# Patient Record
Sex: FEMALE | Race: WHITE | NOT HISPANIC OR LATINO
[De-identification: names, ages, dates, MRNs, and addresses within clinical notes are randomized per-mention and may not be internally consistent; named-entity substitution may affect disease eponyms.]

---

## 2021-07-22 PROBLEM — Z00.00 ENCOUNTER FOR PREVENTIVE HEALTH EXAMINATION: Status: ACTIVE | Noted: 2021-07-22

## 2021-08-03 DIAGNOSIS — K57.90 DIVERTICULOSIS OF INTESTINE, PART UNSPECIFIED, W/OUT PERFORATION OR ABSCESS W/OUT BLEEDING: ICD-10-CM

## 2021-08-03 DIAGNOSIS — Z86.79 PERSONAL HISTORY OF OTHER DISEASES OF THE CIRCULATORY SYSTEM: ICD-10-CM

## 2021-08-03 DIAGNOSIS — Z85.528 PERSONAL HISTORY OF OTHER MALIGNANT NEOPLASM OF KIDNEY: ICD-10-CM

## 2021-08-05 ENCOUNTER — APPOINTMENT (OUTPATIENT)
Dept: SURGERY | Facility: CLINIC | Age: 66
End: 2021-08-05
Payer: MEDICARE

## 2021-08-05 VITALS
BODY MASS INDEX: 22.98 KG/M2 | WEIGHT: 114 LBS | HEART RATE: 114 BPM | HEIGHT: 59 IN | TEMPERATURE: 97.6 F | DIASTOLIC BLOOD PRESSURE: 89 MMHG | SYSTOLIC BLOOD PRESSURE: 129 MMHG

## 2021-08-05 PROCEDURE — 99214 OFFICE O/P EST MOD 30 MIN: CPT

## 2021-08-05 RX ORDER — TRIAMTERENE AND HYDROCHLOROTHIAZIDE 37.5; 25 MG/1; MG/1
CAPSULE ORAL
Refills: 0 | Status: ACTIVE | COMMUNITY

## 2021-08-05 NOTE — HISTORY OF PRESENT ILLNESS
[de-identified] : Pt returns after recent hospitalization for localized ruptured diverticulitis. This was her first episode. She has done well with conservative management. Now off all po abx since going home and feels well. She has no abd pain.

## 2021-08-05 NOTE — PHYSICAL EXAM
[Abdominal Masses] : No abdominal masses [Abdomen Tenderness] : ~T ~M No abdominal tenderness [de-identified] : NAD [de-identified] : benign

## 2021-08-05 NOTE — PLAN
[FreeTextEntry1] : She will f/u with me if sx recur. She will see her GI Dr Burks and f/u with her PCO Dr Carpenter

## 2021-08-05 NOTE — ASSESSMENT
[FreeTextEntry1] : Long discussion with the patient and her  regarding options. My recommendation is to have her f/u with her GI Dr Leo Burks for further dietary management and consideration of an interval C scope. Elective surgery discussed but I would not recommend elective sigmoid resection at this time unless her symptoms recur.

## 2021-08-05 NOTE — CONSULT LETTER
[Dear  ___] : Dear  [unfilled], [Courtesy Letter:] : I had the pleasure of seeing your patient, [unfilled], in my office today. [DrHerbert  ___] : Dr. MOMIN

## 2021-09-16 ENCOUNTER — APPOINTMENT (OUTPATIENT)
Dept: SURGERY | Facility: CLINIC | Age: 66
End: 2021-09-16
Payer: MEDICARE

## 2021-09-16 VITALS
TEMPERATURE: 97.1 F | SYSTOLIC BLOOD PRESSURE: 161 MMHG | HEART RATE: 82 BPM | WEIGHT: 112 LBS | DIASTOLIC BLOOD PRESSURE: 95 MMHG | BODY MASS INDEX: 22.58 KG/M2 | HEIGHT: 59 IN

## 2021-09-16 PROCEDURE — 99213 OFFICE O/P EST LOW 20 MIN: CPT

## 2021-09-16 NOTE — HISTORY OF PRESENT ILLNESS
[de-identified] : The patient presents to report on a few occasions, especially over the past two weeks, of abdominal twinges and aches that are short, intermittent and sharp. these have been more frequent recently. She has nml bowel movements and is s/p an episode of diverticulitis requiring hospitalization and IV thx. That was her first attack.. She relates to increase stress recently. \par She has seen her GI, Dr Cheung who is planning ojn a C scope at some point.

## 2021-09-16 NOTE — REVIEW OF SYSTEMS
[Abdominal Pain] : abdominal pain [Negative] : Respiratory [Fever] : no fever [Chills] : no chills [Vomiting] : no vomiting [Constipation] : no constipation [Diarrhea] : no diarrhea

## 2021-09-16 NOTE — PHYSICAL EXAM
[Normal Breath Sounds] : Normal breath sounds [Normal Heart Sounds] : normal heart sounds [de-identified] : NAD [de-identified] : soft, benign, mild LLQ discomfort to deep palpation, no rebound or guarding,

## 2021-09-16 NOTE — ASSESSMENT
[FreeTextEntry1] : mild divertciulitis, no abx ior intervention suggested currently since she is asx

## 2021-09-16 NOTE — PLAN
[FreeTextEntry1] : to f/u with her GI for a C scope, she will continue to monitor symptoms and if they recur or are more severe can discuss abx use vs surgical intervention. But this is not recommended currently.

## 2021-09-20 ENCOUNTER — APPOINTMENT (OUTPATIENT)
Dept: BARIATRICS/WEIGHT MGMT | Facility: CLINIC | Age: 66
End: 2021-09-20

## 2022-11-22 ENCOUNTER — APPOINTMENT (OUTPATIENT)
Dept: SURGERY | Facility: CLINIC | Age: 67
End: 2022-11-22

## 2022-11-22 VITALS
WEIGHT: 116 LBS | HEIGHT: 58 IN | SYSTOLIC BLOOD PRESSURE: 146 MMHG | TEMPERATURE: 98 F | BODY MASS INDEX: 24.35 KG/M2 | DIASTOLIC BLOOD PRESSURE: 84 MMHG | HEART RATE: 91 BPM

## 2022-11-22 DIAGNOSIS — R10.32 LEFT LOWER QUADRANT PAIN: ICD-10-CM

## 2022-11-22 PROCEDURE — 99213 OFFICE O/P EST LOW 20 MIN: CPT

## 2022-11-22 RX ORDER — LIDOCAINE HCL 4 %
250 MCG CREAM (GRAM) TOPICAL
Refills: 0 | Status: ACTIVE | COMMUNITY

## 2022-11-22 NOTE — ASSESSMENT
[FreeTextEntry1] : clinical impression, mild diverticulitis \par will need labs/CT scan and will empirically start Augmentin, she knows to go to the ED if her conditions worsens .

## 2022-11-22 NOTE — HISTORY OF PRESENT ILLNESS
[de-identified] : the patient returns for recent 3 day symptoms of intermittent sharp LLQ associated with some constipation and hard stool. She has been well for the past 1 1/2 yrs but 3 days ago felt a sharp pain in ht LLQ which went away quickly but came back intermittently yet.  [de-identified] : Previous visit  9/21/2022:\par \par The patient presents to report on a few occasions, especially over the past two weeks, of abdominal twinges and aches that are short, intermittent and sharp. these have been more frequent recently. She has nml bowel movements and is s/p an episode of diverticulitis requiring hospitalization and IV thx. That was her first attack.. She relates to increase stress recently. \par She has seen her GI, Dr Cheung who is planning ojn a C scope at some point.\par \par Plan:to f/u with her GI for a C scope, she will continue to monitor symptoms and if they recur or are more severe can discuss abx use vs surgical intervention. But this is not recommended currently.\par

## 2022-11-23 RX ORDER — AMOXICILLIN AND CLAVULANATE POTASSIUM 500; 125 MG/1; MG/1
500-125 TABLET, FILM COATED ORAL
Qty: 10 | Refills: 1 | Status: ACTIVE | COMMUNITY
Start: 2022-11-23 | End: 1900-01-01

## 2022-11-28 ENCOUNTER — APPOINTMENT (OUTPATIENT)
Dept: BREAST CENTER | Facility: CLINIC | Age: 67
End: 2022-11-28

## 2022-11-28 ENCOUNTER — NON-APPOINTMENT (OUTPATIENT)
Age: 67
End: 2022-11-28

## 2022-11-28 VITALS
HEIGHT: 58 IN | DIASTOLIC BLOOD PRESSURE: 83 MMHG | SYSTOLIC BLOOD PRESSURE: 144 MMHG | HEART RATE: 83 BPM | WEIGHT: 113 LBS | BODY MASS INDEX: 23.72 KG/M2 | OXYGEN SATURATION: 97 %

## 2022-11-28 DIAGNOSIS — N60.11 DIFFUSE CYSTIC MASTOPATHY OF LEFT BREAST: ICD-10-CM

## 2022-11-28 DIAGNOSIS — K57.30 DIVERTICULOSIS OF LARGE INTESTINE W/OUT PERFORATION OR ABSCESS W/OUT BLEEDING: ICD-10-CM

## 2022-11-28 DIAGNOSIS — N64.4 MASTODYNIA: ICD-10-CM

## 2022-11-28 DIAGNOSIS — N60.12 DIFFUSE CYSTIC MASTOPATHY OF LEFT BREAST: ICD-10-CM

## 2022-11-28 PROCEDURE — 99203 OFFICE O/P NEW LOW 30 MIN: CPT

## 2022-11-28 NOTE — ASSESSMENT
[FreeTextEntry1] : The patient is a 67-year-old G5, P0 postmenopausal white female of [place] descent.  She underwent menopause at age 48 and never took any hormone replacement therapy.  She underwent menarche at age 11.  She has a questionable family history of breast cancer and a maternal great aunt.  He has a history of undergoing a partial nephrectomy for renal cell carcinoma not requiring any adjuvant therapy back in 2003.  She has been having some lateral left breast tenderness but screening mammography in July 2022 was negative.  She was noted to have some thickening on the lateral left breast by her primary care physician.  I reviewed her mammography and from July 27, 2022 performed at [facility] which showed no suspicious findings.  On exam, I cannot feel any suspicious findings around the lateral aspect of the left breast and she does have this benign lipoma felt along the far lateral left chest wall which is clinically benign.  She does have some tenderness however on the lateral left breast.  This may be due to her chronic use of underwire bras.  I would like her to get a bilateral breast ultrasound just to be sure that there are no suspicious breast findings on ultrasound and if that is negative, I think she can just go back to her routine yearly clinical exams and her next mammography would not be due until July 2023.

## 2022-11-28 NOTE — HISTORY OF PRESENT ILLNESS
[FreeTextEntry1] : The patient is a 67-year-old G5, P0 postmenopausal white female of [place] descent.  She underwent menopause at age 48 and never took any hormone replacement therapy.  She underwent menarche at age 11.  She has a questionable family history of breast cancer and a maternal great aunt.  He has a history of undergoing a partial nephrectomy for renal cell carcinoma not requiring any adjuvant therapy back in 2003.  She has been having some lateral left breast tenderness but screening mammography in July 2022 was negative.  She was noted to have some thickening by her primary care physician on physical exam and comes in now for a surgical evaluation.

## 2022-11-28 NOTE — REVIEW OF SYSTEMS
[Abdominal Pain] : abdominal pain [Diarrhea] : diarrhea [Heartburn] : heartburn [Negative] : Heme/Lymph

## 2022-11-28 NOTE — ADDENDUM
[FreeTextEntry1] : I spent greater than 75% of the consultation in face-to-face counseling and coordination of care for her left lateral breast tenderness.

## 2022-11-28 NOTE — PAST MEDICAL HISTORY
[Menarche Age ____] : age at menarche was [unfilled] [Menopause Age____] : age at menopause was [unfilled] [Total Preg ___] : G[unfilled] [Premature ___] : Premature: [unfilled] [History of Hormone Replacement Treatment] : has no history of hormone replacement treatment

## 2022-11-28 NOTE — PHYSICAL EXAM
[Normal Breath Sounds] : Normal breath sounds [Normal Heart Sounds] : normal heart sounds [No Rash or Lesion] : No rash or lesion [Alert] : alert [Oriented to Person] : oriented to person [Oriented to Place] : oriented to place [Oriented to Time] : oriented to time [Calm] : calm [Normocephalic] : normocephalic [Atraumatic] : atraumatic [EOMI] : extra ocular movement intact [Supple] : supple [No Supraclavicular Adenopathy] : no supraclavicular adenopathy [No Cervical Adenopathy] : no cervical adenopathy [Examined in the supine and seated position] : examined in the supine and seated position [No dominant masses] : no dominant masses in right breast  [No dominant masses] : no dominant masses left breast [No Nipple Retraction] : no left nipple retraction [No Nipple Discharge] : no left nipple discharge [Breast Mass Right Breast ___cm] : no masses [Breast Mass Left Breast ___cm] : no masses [Breast Nipple Inversion] : nipples not inverted [Breast Nipple Retraction] : nipples not retracted [Breast Nipple Flattening] : nipples not flattened [Breast Nipple Fissures] : nipples not fissured [Breast Abnormal Lactation (Galactorrhea)] : no galactorrhea [Breast Abnormal Secretion Bloody Fluid] : no bloody discharge [Breast Abnormal Secretion Serous Fluid] : no serous discharge [Breast Abnormal Secretion Opalescent Fluid] : no milky discharge [No Axillary Lymphadenopathy] : no left axillary lymphadenopathy [No Edema] : no edema [No Rashes] : no rashes [No Ulceration] : no ulceration [de-identified] : On exam, the patient has ptotic C-cup breasts.  On palpation, I cannot feel any suspicious densities in either breast but she does complain of some left lateral breast tenderness.  I can feel a lipoma along the far lateral chest wall however.  She has no axillary, supraclavicular, or cervical adenopathy.

## 2022-11-28 NOTE — REASON FOR VISIT
[Initial Evaluation] : an initial evaluation [FreeTextEntry1] : The patient is a postmenopausal female of [place] descent with a questionable family history of breast cancer who has been noticing some left lateral breast pain and was referred by her primary care physician.

## 2022-12-12 ENCOUNTER — RESULT REVIEW (OUTPATIENT)
Age: 67
End: 2022-12-12

## 2022-12-13 ENCOUNTER — NON-APPOINTMENT (OUTPATIENT)
Age: 67
End: 2022-12-13

## 2023-03-07 ENCOUNTER — TRANSCRIPTION ENCOUNTER (OUTPATIENT)
Age: 68
End: 2023-03-07

## 2023-03-14 ENCOUNTER — TRANSCRIPTION ENCOUNTER (OUTPATIENT)
Age: 68
End: 2023-03-14

## 2023-03-25 ENCOUNTER — OFFICE (OUTPATIENT)
Dept: URBAN - METROPOLITAN AREA CLINIC 29 | Facility: CLINIC | Age: 68
Setting detail: OPHTHALMOLOGY
End: 2023-03-25
Payer: MEDICARE

## 2023-03-25 DIAGNOSIS — H40.1111: ICD-10-CM

## 2023-03-25 DIAGNOSIS — H43.391: ICD-10-CM

## 2023-03-25 DIAGNOSIS — H40.1231: ICD-10-CM

## 2023-03-25 DIAGNOSIS — H43.813: ICD-10-CM

## 2023-03-25 DIAGNOSIS — H40.1121: ICD-10-CM

## 2023-03-25 DIAGNOSIS — Z96.1: ICD-10-CM

## 2023-03-25 DIAGNOSIS — D18.01: ICD-10-CM

## 2023-03-25 DIAGNOSIS — H26.493: ICD-10-CM

## 2023-03-25 PROCEDURE — 92014 COMPRE OPH EXAM EST PT 1/>: CPT | Performed by: OPHTHALMOLOGY

## 2023-03-25 PROCEDURE — 92020 GONIOSCOPY: CPT | Performed by: OPHTHALMOLOGY

## 2023-03-25 PROCEDURE — 92133 CPTRZD OPH DX IMG PST SGM ON: CPT | Performed by: OPHTHALMOLOGY

## 2023-03-25 ASSESSMENT — SPHEQUIV_DERIVED
OD_SPHEQUIV: -2.375
OS_SPHEQUIV: -2.75
OS_SPHEQUIV: -2.75
OD_SPHEQUIV: -2.25
OD_SPHEQUIV: -2.25
OS_SPHEQUIV: -3.25
OD_SPHEQUIV: -2.375
OS_SPHEQUIV: -2.875

## 2023-03-25 ASSESSMENT — REFRACTION_CURRENTRX
OS_ADD: +2.50
OS_VPRISM_DIRECTION: TRF
OD_VPRISM_DIRECTION: TRF
OD_CYLINDER: +1.25
OD_OVR_VA: 20/
OS_ADD: +2.75
OD_AXIS: 90
OD_ADD: +2.75
OD_CYLINDER: +1.50
OS_CYLINDER: +0.75
OD_SPHERE: -2.00
OS_OVR_VA: 20/
OS_SPHERE: -3.25
OD_VPRISM_DIRECTION: TRF
OD_SPHERE: -2.75
OS_VPRISM_DIRECTION: TRF
OS_CYLINDER: +1.00
OD_SPHERE: -2.00
OD_VPRISM_DIRECTION: TRF
OS_AXIS: 160
OS_SPHERE: -4.50
OS_AXIS: 180
OS_OVR_VA: 20/
OS_VPRISM_DIRECTION: TRF
OS_CYLINDER: +1.25
OS_ADD: +2.50
OD_ADD: +2.50
OD_AXIS: 120
OD_CYLINDER: SPHERE
OS_OVR_VA: 20/
OD_ADD: +2.50
OD_OVR_VA: 20/
OD_AXIS: 123
OD_OVR_VA: 20/
OS_SPHERE: -4.50
OS_AXIS: 171

## 2023-03-25 ASSESSMENT — TONOMETRY
OD_IOP_MMHG: 14
OS_IOP_MMHG: 14

## 2023-03-25 ASSESSMENT — REFRACTION_MANIFEST
OU_VA: 20/20
OD_CYLINDER: +0.25
OS_AXIS: 160
OU_VA: 20/20
OS_ADD: +2.75
OS_VA1: 20/20-1
OS_SPHERE: -3.25
OS_VA1: 20/20-2
OD_AXIS: 90
OS_AXIS: 180
OS_SPHERE: -3.50
OS_SPHERE: -3.25
OD_AXIS: 90
OD_VA1: 20/20
OS_AXIS: 165
OD_CYLINDER: +0.50
OD_VA1: 20/20
OS_CYLINDER: +0.75
OS_VA1: 20/20-2
OD_SPHERE: -2.50
OS_CYLINDER: +0.50
OD_SPHERE: -2.50
OD_AXIS: 090
OD_ADD: +2.75
OD_CYLINDER: +0.25
OS_CYLINDER: +1.00
OD_SPHERE: -2.50
OD_VA1: 20/20

## 2023-03-25 ASSESSMENT — REFRACTION_AUTOREFRACTION
OS_SPHERE: -3.25
OS_AXIS: 175
OD_CYLINDER: +0.50
OD_AXIS: 025
OS_CYLINDER: +1.00
OD_SPHERE: -2.50

## 2023-03-25 ASSESSMENT — CONFRONTATIONAL VISUAL FIELD TEST (CVF)
OS_FINDINGS: FULL
OD_FINDINGS: FULL

## 2023-03-25 ASSESSMENT — VISUAL ACUITY
OD_BCVA: 20/20-2
OS_BCVA: 20/30+2

## 2023-09-20 ENCOUNTER — OFFICE (OUTPATIENT)
Dept: URBAN - METROPOLITAN AREA CLINIC 30 | Facility: CLINIC | Age: 68
Setting detail: OPHTHALMOLOGY
End: 2023-09-20
Payer: MEDICARE

## 2023-09-20 DIAGNOSIS — H40.1111: ICD-10-CM

## 2023-09-20 DIAGNOSIS — H43.813: ICD-10-CM

## 2023-09-20 DIAGNOSIS — H40.1231: ICD-10-CM

## 2023-09-20 DIAGNOSIS — Z96.1: ICD-10-CM

## 2023-09-20 DIAGNOSIS — D18.01: ICD-10-CM

## 2023-09-20 DIAGNOSIS — H40.1121: ICD-10-CM

## 2023-09-20 DIAGNOSIS — H26.493: ICD-10-CM

## 2023-09-20 DIAGNOSIS — H43.391: ICD-10-CM

## 2023-09-20 DIAGNOSIS — H33.311: ICD-10-CM

## 2023-09-20 PROCEDURE — 99214 OFFICE O/P EST MOD 30 MIN: CPT | Performed by: OPHTHALMOLOGY

## 2023-09-20 PROCEDURE — 92201 OPSCPY EXTND RTA DRAW UNI/BI: CPT | Performed by: OPHTHALMOLOGY

## 2023-09-20 PROCEDURE — 92083 EXTENDED VISUAL FIELD XM: CPT | Performed by: OPHTHALMOLOGY

## 2023-09-20 ASSESSMENT — REFRACTION_MANIFEST
OU_VA: 20/20
OS_ADD: +2.75
OS_AXIS: 180
OS_VA1: 20/20-1
OS_CYLINDER: +0.75
OD_CYLINDER: +0.50
OD_SPHERE: -2.50
OD_AXIS: 090
OD_SPHERE: -2.50
OS_AXIS: 165
OD_VA1: 20/20
OS_VA1: 20/20-2
OD_ADD: +2.75
OD_AXIS: 90
OD_AXIS: 90
OU_VA: 20/20
OS_SPHERE: -3.25
OD_SPHERE: -2.50
OS_CYLINDER: +1.00
OD_VA1: 20/20
OS_CYLINDER: +0.50
OD_VA1: 20/20
OS_VA1: 20/20-2
OS_SPHERE: -3.50
OD_CYLINDER: +0.25
OS_AXIS: 160
OS_SPHERE: -3.25
OD_CYLINDER: +0.25

## 2023-09-20 ASSESSMENT — REFRACTION_AUTOREFRACTION
OS_SPHERE: -3.25
OD_AXIS: 025
OS_AXIS: 175
OD_SPHERE: -2.50
OS_CYLINDER: +1.00
OD_CYLINDER: +0.50

## 2023-09-20 ASSESSMENT — REFRACTION_CURRENTRX
OD_ADD: +2.75
OD_CYLINDER: +1.50
OS_SPHERE: -4.50
OD_VPRISM_DIRECTION: TRF
OD_AXIS: 123
OS_CYLINDER: +1.00
OS_ADD: +2.50
OS_CYLINDER: +1.25
OS_VPRISM_DIRECTION: TRF
OS_AXIS: 171
OS_VPRISM_DIRECTION: TRF
OS_ADD: +2.75
OD_VPRISM_DIRECTION: TRF
OD_OVR_VA: 20/
OD_ADD: +2.50
OS_SPHERE: -3.25
OS_OVR_VA: 20/
OS_OVR_VA: 20/
OD_OVR_VA: 20/
OD_VPRISM_DIRECTION: TRF
OS_OVR_VA: 20/
OS_ADD: +2.50
OD_AXIS: 90
OS_AXIS: 160
OD_SPHERE: -2.00
OD_CYLINDER: SPHERE
OD_CYLINDER: +1.25
OD_AXIS: 120
OD_SPHERE: -2.75
OD_SPHERE: -2.00
OD_ADD: +2.50
OS_CYLINDER: +0.75
OD_OVR_VA: 20/
OS_AXIS: 180
OS_SPHERE: -4.50
OS_VPRISM_DIRECTION: TRF

## 2023-09-20 ASSESSMENT — CONFRONTATIONAL VISUAL FIELD TEST (CVF)
OS_FINDINGS: FULL
OD_FINDINGS: FULL

## 2023-09-20 ASSESSMENT — SPHEQUIV_DERIVED
OD_SPHEQUIV: -2.375
OS_SPHEQUIV: -2.75
OS_SPHEQUIV: -2.75
OD_SPHEQUIV: -2.25
OS_SPHEQUIV: -2.875
OD_SPHEQUIV: -2.25
OS_SPHEQUIV: -3.25
OD_SPHEQUIV: -2.375

## 2023-09-20 ASSESSMENT — VISUAL ACUITY
OS_BCVA: 20/25
OD_BCVA: 20/20-2

## 2023-09-20 ASSESSMENT — TONOMETRY
OS_IOP_MMHG: 14
OD_IOP_MMHG: 14

## 2023-11-18 ENCOUNTER — RESULT REVIEW (OUTPATIENT)
Age: 68
End: 2023-11-18

## 2023-11-20 ENCOUNTER — TRANSCRIPTION ENCOUNTER (OUTPATIENT)
Age: 68
End: 2023-11-20

## 2024-03-13 ENCOUNTER — OFFICE (OUTPATIENT)
Dept: URBAN - METROPOLITAN AREA CLINIC 30 | Facility: CLINIC | Age: 69
Setting detail: OPHTHALMOLOGY
End: 2024-03-13
Payer: MEDICARE

## 2024-03-13 DIAGNOSIS — H33.311: ICD-10-CM

## 2024-03-13 DIAGNOSIS — H43.813: ICD-10-CM

## 2024-03-13 DIAGNOSIS — H40.1111: ICD-10-CM

## 2024-03-13 DIAGNOSIS — H26.493: ICD-10-CM

## 2024-03-13 DIAGNOSIS — H43.391: ICD-10-CM

## 2024-03-13 DIAGNOSIS — Z96.1: ICD-10-CM

## 2024-03-13 DIAGNOSIS — H40.1121: ICD-10-CM

## 2024-03-13 DIAGNOSIS — H40.1231: ICD-10-CM

## 2024-03-13 DIAGNOSIS — D18.01: ICD-10-CM

## 2024-03-13 PROCEDURE — 92133 CPTRZD OPH DX IMG PST SGM ON: CPT | Performed by: OPHTHALMOLOGY

## 2024-03-13 PROCEDURE — 92020 GONIOSCOPY: CPT | Performed by: OPHTHALMOLOGY

## 2024-03-13 PROCEDURE — 92014 COMPRE OPH EXAM EST PT 1/>: CPT | Performed by: OPHTHALMOLOGY

## 2024-03-13 ASSESSMENT — REFRACTION_CURRENTRX
OD_AXIS: 120
OS_OVR_VA: 20/
OS_SPHERE: -4.50
OD_VPRISM_DIRECTION: TRF
OD_AXIS: 123
OD_ADD: +2.50
OS_CYLINDER: +1.00
OS_ADD: +2.75
OS_AXIS: 171
OD_ADD: +2.75
OS_ADD: +2.50
OD_VPRISM_DIRECTION: TRF
OS_VPRISM_DIRECTION: TRF
OS_CYLINDER: +0.75
OD_ADD: +2.50
OS_SPHERE: -4.50
OS_ADD: +2.50
OS_AXIS: 180
OD_SPHERE: -2.00
OS_VPRISM_DIRECTION: TRF
OD_CYLINDER: +1.25
OS_CYLINDER: +1.25
OD_OVR_VA: 20/
OD_SPHERE: -2.75
OD_VPRISM_DIRECTION: TRF
OS_SPHERE: -3.25
OD_OVR_VA: 20/
OD_CYLINDER: +1.50
OS_OVR_VA: 20/
OS_OVR_VA: 20/
OS_VPRISM_DIRECTION: TRF
OD_CYLINDER: SPHERE
OD_OVR_VA: 20/
OD_SPHERE: -2.25
OS_AXIS: 160

## 2024-03-13 ASSESSMENT — REFRACTION_MANIFEST
OS_AXIS: 180
OS_SPHERE: -3.25
OS_CYLINDER: +0.50
OS_AXIS: 165
OD_CYLINDER: +0.25
OS_VA1: 20/20-2
OD_CYLINDER: +0.25
OD_SPHERE: -2.50
OD_VA1: 20/20
OS_VA1: 20/20-1
OS_ADD: +2.75
OD_ADD: +2.75
OS_CYLINDER: +1.00
OU_VA: 20/20
OS_SPHERE: -3.50
OU_VA: 20/20
OS_VA1: 20/20-2
OD_SPHERE: -2.50
OS_CYLINDER: +0.75
OD_VA1: 20/20
OD_AXIS: 090
OD_VA1: 20/20
OD_CYLINDER: +0.50
OD_AXIS: 90
OS_SPHERE: -3.25
OD_SPHERE: -2.50
OD_AXIS: 90
OS_AXIS: 160

## 2024-03-13 ASSESSMENT — SPHEQUIV_DERIVED
OD_SPHEQUIV: -2.375
OS_SPHEQUIV: -2.875
OD_SPHEQUIV: -2.375
OS_SPHEQUIV: -2.75
OD_SPHEQUIV: -2.25
OS_SPHEQUIV: -3.25

## 2024-06-12 ENCOUNTER — OFFICE (OUTPATIENT)
Dept: URBAN - METROPOLITAN AREA CLINIC 30 | Facility: CLINIC | Age: 69
Setting detail: OPHTHALMOLOGY
End: 2024-06-12
Payer: MEDICARE

## 2024-06-12 DIAGNOSIS — H43.813: ICD-10-CM

## 2024-06-12 DIAGNOSIS — D18.01: ICD-10-CM

## 2024-06-12 DIAGNOSIS — H40.1121: ICD-10-CM

## 2024-06-12 DIAGNOSIS — H40.1111: ICD-10-CM

## 2024-06-12 DIAGNOSIS — H43.391: ICD-10-CM

## 2024-06-12 DIAGNOSIS — H40.1231: ICD-10-CM

## 2024-06-12 DIAGNOSIS — Z96.1: ICD-10-CM

## 2024-06-12 DIAGNOSIS — H33.311: ICD-10-CM

## 2024-06-12 DIAGNOSIS — H26.493: ICD-10-CM

## 2024-06-12 PROCEDURE — 99214 OFFICE O/P EST MOD 30 MIN: CPT | Performed by: OPHTHALMOLOGY

## 2024-06-12 PROCEDURE — 92083 EXTENDED VISUAL FIELD XM: CPT | Performed by: OPHTHALMOLOGY

## 2024-06-12 ASSESSMENT — CONFRONTATIONAL VISUAL FIELD TEST (CVF)
OD_FINDINGS: FULL
OS_FINDINGS: FULL

## 2024-09-25 ENCOUNTER — OFFICE (OUTPATIENT)
Facility: LOCATION | Age: 69
Setting detail: OPHTHALMOLOGY
End: 2024-09-25
Payer: MEDICARE

## 2024-09-25 DIAGNOSIS — H40.1111: ICD-10-CM

## 2024-09-25 DIAGNOSIS — H33.311: ICD-10-CM

## 2024-09-25 DIAGNOSIS — H40.1231: ICD-10-CM

## 2024-09-25 DIAGNOSIS — H43.391: ICD-10-CM

## 2024-09-25 DIAGNOSIS — H40.1121: ICD-10-CM

## 2024-09-25 DIAGNOSIS — Z96.1: ICD-10-CM

## 2024-09-25 DIAGNOSIS — H26.493: ICD-10-CM

## 2024-09-25 DIAGNOSIS — D18.01: ICD-10-CM

## 2024-09-25 DIAGNOSIS — H43.813: ICD-10-CM

## 2024-09-25 PROCEDURE — 99214 OFFICE O/P EST MOD 30 MIN: CPT | Performed by: OPHTHALMOLOGY

## 2024-09-25 ASSESSMENT — CONFRONTATIONAL VISUAL FIELD TEST (CVF)
OS_FINDINGS: FULL
OD_FINDINGS: FULL

## 2024-10-23 ENCOUNTER — OFFICE (OUTPATIENT)
Facility: LOCATION | Age: 69
Setting detail: OPHTHALMOLOGY
End: 2024-10-23
Payer: MEDICARE

## 2024-10-23 DIAGNOSIS — H40.1111: ICD-10-CM

## 2024-10-23 PROCEDURE — 65855 TRABECULOPLASTY LASER SURG: CPT | Mod: RT | Performed by: OPHTHALMOLOGY

## 2024-10-23 ASSESSMENT — REFRACTION_MANIFEST
OD_SPHERE: -2.50
OD_CYLINDER: +0.25
OD_SPHERE: -2.50
OD_AXIS: 90
OU_VA: 20/20
OS_SPHERE: -3.25
OD_VA1: 20/20
OS_AXIS: 180
OS_CYLINDER: +1.00
OD_CYLINDER: +0.50
OS_VA1: 20/20-2
OD_VA1: 20/20
OD_AXIS: 090
OS_AXIS: 165
OD_ADD: +2.75
OS_SPHERE: -3.25
OS_SPHERE: -3.50
OD_VA1: 20/20
OS_VA1: 20/20-1
OS_ADD: +2.75
OS_CYLINDER: +0.50
OD_CYLINDER: +0.25
OD_SPHERE: -2.50
OS_AXIS: 160
OS_CYLINDER: +0.75
OU_VA: 20/20
OD_AXIS: 90
OS_VA1: 20/20-2

## 2024-10-23 ASSESSMENT — TONOMETRY
OD_IOP_MMHG: 13
OS_IOP_MMHG: 15

## 2024-10-23 ASSESSMENT — REFRACTION_CURRENTRX
OD_OVR_VA: 20/
OS_CYLINDER: +1.25
OD_VPRISM_DIRECTION: TRF
OS_OVR_VA: 20/
OD_ADD: +2.50
OS_AXIS: 160
OS_CYLINDER: +1.00
OS_AXIS: 180
OD_VPRISM_DIRECTION: TRF
OS_CYLINDER: +0.75
OD_AXIS: 123
OD_SPHERE: -2.25
OD_AXIS: 120
OS_ADD: +2.75
OS_SPHERE: -4.50
OS_OVR_VA: 20/
OD_OVR_VA: 20/
OD_VPRISM_DIRECTION: TRF
OD_ADD: +2.75
OS_VPRISM_DIRECTION: TRF
OD_OVR_VA: 20/
OS_ADD: +2.50
OS_SPHERE: -4.50
OS_OVR_VA: 20/
OS_AXIS: 171
OD_CYLINDER: +1.50
OS_VPRISM_DIRECTION: TRF
OD_CYLINDER: +1.25
OS_ADD: +2.50
OD_SPHERE: -2.00
OS_VPRISM_DIRECTION: TRF
OD_CYLINDER: SPHERE
OD_ADD: +2.50
OD_SPHERE: -2.75
OS_SPHERE: -3.25

## 2024-10-23 ASSESSMENT — VISUAL ACUITY
OS_BCVA: 20/50
OD_BCVA: 20/40+1

## 2024-10-23 ASSESSMENT — REFRACTION_AUTOREFRACTION
OS_AXIS: 175
OS_SPHERE: -3.25
OD_SPHERE: -2.50
OS_CYLINDER: +1.00
OD_CYLINDER: +0.50
OD_AXIS: 025

## 2024-10-23 ASSESSMENT — CONFRONTATIONAL VISUAL FIELD TEST (CVF)
OD_FINDINGS: FULL
OS_FINDINGS: FULL

## 2024-12-13 ENCOUNTER — RX ONLY (RX ONLY)
Age: 69
End: 2024-12-13

## 2024-12-13 ENCOUNTER — OFFICE (OUTPATIENT)
Dept: URBAN - METROPOLITAN AREA CLINIC 29 | Facility: CLINIC | Age: 69
Setting detail: OPHTHALMOLOGY
End: 2024-12-13
Payer: MEDICARE

## 2024-12-13 DIAGNOSIS — Z96.1: ICD-10-CM

## 2024-12-13 DIAGNOSIS — H40.1131: ICD-10-CM

## 2024-12-13 DIAGNOSIS — H43.391: ICD-10-CM

## 2024-12-13 DIAGNOSIS — H33.311: ICD-10-CM

## 2024-12-13 DIAGNOSIS — H26.491: ICD-10-CM

## 2024-12-13 PROCEDURE — 66821 AFTER CATARACT LASER SURGERY: CPT | Mod: RT | Performed by: OPHTHALMOLOGY

## 2024-12-13 PROCEDURE — 99213 OFFICE O/P EST LOW 20 MIN: CPT | Mod: 57 | Performed by: OPHTHALMOLOGY

## 2024-12-13 ASSESSMENT — REFRACTION_CURRENTRX
OD_SPHERE: -2.00
OS_SPHERE: -4.50
OD_SPHERE: -2.75
OS_SPHERE: -4.50
OS_CYLINDER: +0.75
OS_ADD: +2.50
OD_CYLINDER: SPHERE
OD_OVR_VA: 20/
OS_AXIS: 180
OS_OVR_VA: 20/
OS_ADD: +2.75
OD_OVR_VA: 20/
OD_AXIS: 123
OD_CYLINDER: +1.50
OD_ADD: +2.75
OS_AXIS: 171
OD_SPHERE: -2.25
OD_VPRISM_DIRECTION: TRF
OS_VPRISM_DIRECTION: TRF
OD_AXIS: 120
OD_VPRISM_DIRECTION: TRF
OS_VPRISM_DIRECTION: TRF
OD_ADD: +2.50
OS_CYLINDER: +1.00
OD_OVR_VA: 20/
OD_VPRISM_DIRECTION: TRF
OS_ADD: +2.50
OS_CYLINDER: +1.25
OS_VPRISM_DIRECTION: TRF
OD_CYLINDER: +1.25
OS_OVR_VA: 20/
OS_SPHERE: -3.25
OS_AXIS: 160
OD_ADD: +2.50
OS_OVR_VA: 20/

## 2024-12-13 ASSESSMENT — REFRACTION_MANIFEST
OD_VA1: 20/20
OS_SPHERE: -3.25
OU_VA: 20/20
OD_SPHERE: -2.50
OD_CYLINDER: +0.25
OD_AXIS: 90
OD_SPHERE: -2.50
OD_AXIS: 090
OD_ADD: +2.75
OD_VA1: 20/20
OS_CYLINDER: +0.50
OD_VA1: 20/20
OS_AXIS: 160
OD_CYLINDER: +0.25
OU_VA: 20/20
OD_SPHERE: -2.50
OS_AXIS: 180
OS_VA1: 20/20-1
OS_SPHERE: -3.25
OS_ADD: +2.75
OS_CYLINDER: +1.00
OD_CYLINDER: +0.50
OD_AXIS: 90
OS_CYLINDER: +0.75
OS_SPHERE: -3.50
OS_AXIS: 165
OS_VA1: 20/20-2
OS_VA1: 20/20-2

## 2024-12-13 ASSESSMENT — CONFRONTATIONAL VISUAL FIELD TEST (CVF)
OD_FINDINGS: FULL
OS_FINDINGS: FULL

## 2024-12-13 ASSESSMENT — REFRACTION_AUTOREFRACTION
OS_CYLINDER: +1.00
OD_AXIS: 025
OD_SPHERE: -2.50
OD_CYLINDER: +0.50
OS_SPHERE: -3.25
OS_AXIS: 175

## 2024-12-13 ASSESSMENT — VISUAL ACUITY
OS_BCVA: 20/60+2
OD_BCVA: 20/60+2

## 2024-12-13 ASSESSMENT — TONOMETRY
OS_IOP_MMHG: 15
OD_IOP_MMHG: 14

## 2025-01-08 ENCOUNTER — RX ONLY (RX ONLY)
Age: 70
End: 2025-01-08

## 2025-01-08 ENCOUNTER — OFFICE (OUTPATIENT)
Facility: LOCATION | Age: 70
Setting detail: OPHTHALMOLOGY
End: 2025-01-08
Payer: MEDICARE

## 2025-01-08 DIAGNOSIS — H26.492: ICD-10-CM

## 2025-01-08 PROCEDURE — 66821 AFTER CATARACT LASER SURGERY: CPT | Mod: 79,LT | Performed by: OPHTHALMOLOGY

## 2025-01-08 ASSESSMENT — REFRACTION_CURRENTRX
OD_CYLINDER: +1.25
OD_ADD: +2.50
OD_ADD: +2.75
OD_OVR_VA: 20/
OS_CYLINDER: +1.25
OS_ADD: +2.50
OD_VPRISM_DIRECTION: TRF
OS_SPHERE: -4.50
OS_ADD: +2.50
OD_ADD: +2.50
OS_AXIS: 160
OD_SPHERE: -2.00
OD_CYLINDER: +1.50
OS_VPRISM_DIRECTION: TRF
OS_VPRISM_DIRECTION: TRF
OS_OVR_VA: 20/
OD_AXIS: 123
OD_AXIS: 120
OD_SPHERE: -2.25
OD_OVR_VA: 20/
OS_CYLINDER: +0.75
OD_OVR_VA: 20/
OS_ADD: +2.75
OS_OVR_VA: 20/
OD_VPRISM_DIRECTION: TRF
OS_AXIS: 171
OS_AXIS: 180
OS_CYLINDER: +1.00
OD_CYLINDER: SPHERE
OS_OVR_VA: 20/
OS_SPHERE: -3.25
OD_SPHERE: -2.75
OS_SPHERE: -4.50
OS_VPRISM_DIRECTION: TRF
OD_VPRISM_DIRECTION: TRF

## 2025-01-08 ASSESSMENT — REFRACTION_MANIFEST
OU_VA: 20/20
OD_AXIS: 90
OS_SPHERE: -3.25
OS_AXIS: 160
OS_AXIS: 180
OD_VA1: 20/20
OD_VA1: 20/20
OD_CYLINDER: +0.50
OU_VA: 20/20
OS_AXIS: 165
OD_CYLINDER: +0.25
OD_SPHERE: -2.50
OS_VA1: 20/20-2
OS_SPHERE: -3.50
OS_CYLINDER: +0.50
OS_SPHERE: -3.25
OD_SPHERE: -2.50
OS_CYLINDER: +1.00
OS_ADD: +2.75
OS_CYLINDER: +0.75
OD_AXIS: 090
OD_SPHERE: -2.50
OD_VA1: 20/20
OD_ADD: +2.75
OS_VA1: 20/20-1
OD_AXIS: 90
OS_VA1: 20/20-2
OD_CYLINDER: +0.25

## 2025-01-08 ASSESSMENT — REFRACTION_AUTOREFRACTION
OD_CYLINDER: +0.50
OS_CYLINDER: +1.00
OS_AXIS: 175
OS_SPHERE: -3.25
OD_SPHERE: -2.50
OD_AXIS: 025

## 2025-01-08 ASSESSMENT — CONFRONTATIONAL VISUAL FIELD TEST (CVF)
OS_FINDINGS: FULL
OD_FINDINGS: FULL

## 2025-01-08 ASSESSMENT — TONOMETRY
OD_IOP_MMHG: 13
OS_IOP_MMHG: 19

## 2025-01-08 ASSESSMENT — VISUAL ACUITY
OD_BCVA: 20/80
OS_BCVA: 20/25

## 2025-04-04 ENCOUNTER — OFFICE (OUTPATIENT)
Dept: URBAN - METROPOLITAN AREA CLINIC 29 | Facility: CLINIC | Age: 70
Setting detail: OPHTHALMOLOGY
End: 2025-04-04
Payer: MEDICARE

## 2025-04-04 DIAGNOSIS — H33.311: ICD-10-CM

## 2025-04-04 DIAGNOSIS — D18.01: ICD-10-CM

## 2025-04-04 DIAGNOSIS — H43.391: ICD-10-CM

## 2025-04-04 DIAGNOSIS — H43.813: ICD-10-CM

## 2025-04-04 DIAGNOSIS — Z96.1: ICD-10-CM

## 2025-04-04 DIAGNOSIS — H40.1121: ICD-10-CM

## 2025-04-04 DIAGNOSIS — H40.1111: ICD-10-CM

## 2025-04-04 DIAGNOSIS — H40.1231: ICD-10-CM

## 2025-04-04 DIAGNOSIS — H26.493: ICD-10-CM

## 2025-04-04 PROBLEM — B88.0 ACARIASIS, INFESTATION OF MITES AND OR TICS: Status: ACTIVE | Noted: 2025-04-04

## 2025-04-04 PROCEDURE — 92133 CPTRZD OPH DX IMG PST SGM ON: CPT | Performed by: OPHTHALMOLOGY

## 2025-04-04 PROCEDURE — 92201 OPSCPY EXTND RTA DRAW UNI/BI: CPT | Performed by: OPHTHALMOLOGY

## 2025-04-04 PROCEDURE — 92083 EXTENDED VISUAL FIELD XM: CPT | Performed by: OPHTHALMOLOGY

## 2025-04-04 PROCEDURE — 99024 POSTOP FOLLOW-UP VISIT: CPT | Mod: 24 | Performed by: OPHTHALMOLOGY

## 2025-04-04 ASSESSMENT — REFRACTION_CURRENTRX
OD_CYLINDER: +1.50
OS_VPRISM_DIRECTION: TRF
OD_CYLINDER: SPHERE
OS_SPHERE: -4.50
OD_VPRISM_DIRECTION: TRF
OD_SPHERE: -2.00
OD_OVR_VA: 20/
OD_VPRISM_DIRECTION: TRF
OS_OVR_VA: 20/
OS_ADD: +2.75
OS_CYLINDER: +1.00
OD_VPRISM_DIRECTION: PROGS
OD_ADD: +2.50
OD_CYLINDER: +0.25
OD_OVR_VA: 20/
OS_ADD: +3.50
OD_AXIS: 123
OS_VPRISM_DIRECTION: TRF
OD_AXIS: 120
OS_ADD: +2.50
OS_AXIS: 160
OD_SPHERE: -2.50
OD_ADD: +2.50
OS_VPRISM_DIRECTION: TRF
OS_AXIS: 171
OS_OVR_VA: 20/
OS_SPHERE: -3.25
OD_ADD: +3.00
OS_CYLINDER: +0.75
OD_ADD: +2.75
OS_AXIS: 138
OD_CYLINDER: +1.25
OS_ADD: +2.50
OS_OVR_VA: 20/
OS_CYLINDER: +1.25
OD_VPRISM_DIRECTION: TRF
OS_AXIS: 180
OS_SPHERE: -4.50
OS_CYLINDER: +0.75
OD_OVR_VA: 20/
OS_VPRISM_DIRECTION: PROGS
OD_SPHERE: -2.25
OD_SPHERE: -2.75
OS_SPHERE: -3.75
OD_AXIS: 097

## 2025-04-04 ASSESSMENT — TONOMETRY
OS_IOP_MMHG: 14
OD_IOP_MMHG: 14

## 2025-04-04 ASSESSMENT — REFRACTION_AUTOREFRACTION
OS_CYLINDER: +2.00
OD_AXIS: 010
OS_AXIS: 180
OS_SPHERE: -3.50
OD_SPHERE: -2.25
OD_CYLINDER: +1.25

## 2025-04-04 ASSESSMENT — LID EXAM ASSESSMENTS
OS_BLEPHARITIS: LUL 2+
OD_BLEPHARITIS: RUL 2+

## 2025-04-04 ASSESSMENT — REFRACTION_MANIFEST
OS_SPHERE: -3.25
OS_VA1: 20/20-2
OD_SPHERE: -2.50
OS_CYLINDER: +0.75
OU_VA: 20/20
OS_ADD: +2.75
OS_SPHERE: -3.50
OD_SPHERE: -2.50
OS_VA1: 20/20-2
OD_AXIS: 090
OD_VA1: 20/20
OS_SPHERE: -3.25
OD_AXIS: 90
OS_CYLINDER: +0.50
OD_AXIS: 90
OS_CYLINDER: +1.00
OD_CYLINDER: +0.50
OD_VA1: 20/20
OD_CYLINDER: +0.25
OS_VA1: 20/20-1
OU_VA: 20/20
OD_ADD: +2.75
OD_SPHERE: -2.50
OD_VA1: 20/20
OS_AXIS: 180
OS_AXIS: 165
OS_AXIS: 160
OD_CYLINDER: +0.25

## 2025-04-04 ASSESSMENT — VISUAL ACUITY
OD_BCVA: 20/25+1
OS_BCVA: 20/25

## 2025-04-04 ASSESSMENT — CONFRONTATIONAL VISUAL FIELD TEST (CVF)
OD_FINDINGS: FULL
OS_FINDINGS: FULL

## 2025-08-05 ENCOUNTER — OFFICE (OUTPATIENT)
Dept: URBAN - METROPOLITAN AREA CLINIC 29 | Facility: CLINIC | Age: 70
Setting detail: OPHTHALMOLOGY
End: 2025-08-05
Payer: MEDICARE

## 2025-08-05 ENCOUNTER — RX ONLY (RX ONLY)
Age: 70
End: 2025-08-05

## 2025-08-05 DIAGNOSIS — H01.001: ICD-10-CM

## 2025-08-05 DIAGNOSIS — H33.311: ICD-10-CM

## 2025-08-05 DIAGNOSIS — D18.01: ICD-10-CM

## 2025-08-05 DIAGNOSIS — B88.0: ICD-10-CM

## 2025-08-05 DIAGNOSIS — H40.1111: ICD-10-CM

## 2025-08-05 DIAGNOSIS — H43.391: ICD-10-CM

## 2025-08-05 DIAGNOSIS — H40.1121: ICD-10-CM

## 2025-08-05 DIAGNOSIS — H40.1231: ICD-10-CM

## 2025-08-05 DIAGNOSIS — H26.493: ICD-10-CM

## 2025-08-05 DIAGNOSIS — H01.004: ICD-10-CM

## 2025-08-05 DIAGNOSIS — H43.813: ICD-10-CM

## 2025-08-05 DIAGNOSIS — Z96.1: ICD-10-CM

## 2025-08-05 PROCEDURE — 99213 OFFICE O/P EST LOW 20 MIN: CPT | Performed by: OPHTHALMOLOGY

## 2025-08-05 ASSESSMENT — REFRACTION_CURRENTRX
OD_VPRISM_DIRECTION: PROGS
OD_ADD: +2.75
OS_CYLINDER: +0.75
OS_OVR_VA: 20/
OD_OVR_VA: 20/
OS_AXIS: 180
OS_SPHERE: -3.25
OD_VPRISM_DIRECTION: TRF
OD_VPRISM_DIRECTION: TRF
OD_ADD: +2.50
OD_VPRISM_DIRECTION: TRF
OD_SPHERE: -2.75
OS_VPRISM_DIRECTION: PROGS
OS_CYLINDER: +1.25
OD_AXIS: 097
OS_VPRISM_DIRECTION: TRF
OS_OVR_VA: 20/
OD_AXIS: 120
OS_ADD: +3.50
OS_OVR_VA: 20/
OD_CYLINDER: +1.25
OD_AXIS: 123
OS_AXIS: 160
OD_CYLINDER: SPHERE
OD_CYLINDER: +1.50
OS_ADD: +2.50
OS_AXIS: 171
OD_OVR_VA: 20/
OS_SPHERE: -4.50
OS_ADD: +2.75
OS_AXIS: 138
OD_SPHERE: -2.00
OD_OVR_VA: 20/
OD_ADD: +2.50
OS_ADD: +2.50
OS_CYLINDER: +1.00
OS_SPHERE: -3.75
OS_SPHERE: -4.50
OD_CYLINDER: +0.25
OD_SPHERE: -2.50
OD_SPHERE: -2.25
OS_CYLINDER: +0.75
OD_ADD: +3.00

## 2025-08-05 ASSESSMENT — REFRACTION_AUTOREFRACTION
OS_AXIS: 180
OD_AXIS: 010
OS_SPHERE: -3.50
OD_SPHERE: -2.25
OD_CYLINDER: +1.25
OS_CYLINDER: +2.00

## 2025-08-05 ASSESSMENT — VISUAL ACUITY
OD_BCVA: 20/30+1
OS_BCVA: 20/20-2

## 2025-08-05 ASSESSMENT — REFRACTION_MANIFEST
OS_ADD: +2.75
OS_CYLINDER: +0.50
OD_CYLINDER: +0.25
OD_CYLINDER: +0.25
OD_SPHERE: -2.50
OS_AXIS: 180
OD_AXIS: 090
OS_VA1: 20/20-2
OS_CYLINDER: +0.75
OD_AXIS: 90
OS_AXIS: 160
OS_SPHERE: -3.25
OS_SPHERE: -3.25
OS_VA1: 20/20-2
OS_SPHERE: -3.50
OD_VA1: 20/20
OS_AXIS: 165
OD_VA1: 20/20
OU_VA: 20/20
OD_ADD: +2.75
OD_VA1: 20/20
OS_CYLINDER: +1.00
OD_SPHERE: -2.50
OD_AXIS: 90
OS_VA1: 20/20-1
OD_SPHERE: -2.50
OU_VA: 20/20
OD_CYLINDER: +0.50

## 2025-08-05 ASSESSMENT — LID EXAM ASSESSMENTS
OD_BLEPHARITIS: RUL T 1+
OS_BLEPHARITIS: LUL T 1+

## 2025-08-05 ASSESSMENT — CONFRONTATIONAL VISUAL FIELD TEST (CVF)
OD_FINDINGS: FULL
OS_FINDINGS: FULL

## 2025-08-05 ASSESSMENT — TONOMETRY
OS_IOP_MMHG: 14
OD_IOP_MMHG: 14